# Patient Record
Sex: FEMALE | URBAN - METROPOLITAN AREA
[De-identification: names, ages, dates, MRNs, and addresses within clinical notes are randomized per-mention and may not be internally consistent; named-entity substitution may affect disease eponyms.]

---

## 2017-05-23 ENCOUNTER — IMPORTED ENCOUNTER (OUTPATIENT)
Dept: URBAN - METROPOLITAN AREA CLINIC 9 | Facility: CLINIC | Age: 52
End: 2017-05-23

## 2018-01-16 NOTE — PATIENT DISCUSSION
reviewed limited VA secondary to density of cataract, reviewd increased complexity of cataract surgery due to density of cataract requiring additional surgery if fragments of the cataract remain.

## 2018-01-16 NOTE — PATIENT DISCUSSION
reviewed high risk due to density of cataract requiring additional surgery if brittle cataract remnants remain.

## 2018-04-23 NOTE — PATIENT DISCUSSION
Patient advised of the right to post-operative care by the surgeon. Patient is fully informed of, and agreed to, co-management with their primary optometric physician. Post-operative care by the surgeon is not medically necessary and co-management is clinically appropriate. Patient has received itemization of fees related to cataract surgery. Transfer of care letter completed for the patient. Transfer care of right eye to Dr. Lorraine Johnson on 4/23/18. Patient instructed to call immediately if any new distortion, blurring, decreased vision or eye pain.

## 2018-05-21 ENCOUNTER — IMPORTED ENCOUNTER (OUTPATIENT)
Dept: URBAN - METROPOLITAN AREA CLINIC 9 | Facility: CLINIC | Age: 53
End: 2018-05-21

## 2018-06-27 ENCOUNTER — IMPORTED ENCOUNTER (OUTPATIENT)
Dept: URBAN - METROPOLITAN AREA CLINIC 9 | Facility: CLINIC | Age: 53
End: 2018-06-27

## 2021-10-18 ASSESSMENT — VISUAL ACUITY
OS_SC: 20/20 SN
OD_SC: 20/20 SN
OS_SC: 20/30 -2 SN
OD_SC: 20/25 -2 SN
OD_SC: 20/30 +2 SN
OS_SC: 20/30 - SN

## 2021-10-18 ASSESSMENT — TONOMETRY
OD_IOP_MMHG: 11
OS_IOP_MMHG: 16
OD_IOP_MMHG: 16
OS_IOP_MMHG: 17
OS_IOP_MMHG: 13
OD_IOP_MMHG: 16